# Patient Record
Sex: FEMALE | Race: OTHER | Employment: OTHER | URBAN - METROPOLITAN AREA
[De-identification: names, ages, dates, MRNs, and addresses within clinical notes are randomized per-mention and may not be internally consistent; named-entity substitution may affect disease eponyms.]

---

## 2018-04-26 NOTE — PATIENT DISCUSSION
Eyes appear healthy . Secondary to  Amblyopia and alternating hypertropia results nay be limited with Advanced Lenses. Recommend Custom.

## 2018-11-21 NOTE — PATIENT DISCUSSION
Eyes appear healthy . Secondary to  Amblyopia and alternating hypertropia results may be limited with [employer] Lenses. Recommend Custom.

## 2018-11-21 NOTE — PATIENT DISCUSSION
PLAN: CE/IOL OS THEN OD, GOAL YANA OU, NOT A CANDIDATE FOR AV SECONDARY TO H/O STRAB SX, P.O W/O.D IN SRQ, NO VAN NEEDED, WANTS CUSTOM OU. WANTS IMPRIMIS OU.

## 2020-11-30 NOTE — PATIENT DISCUSSION
The patient was informed that with [address] [facility] for distance, they will need glasses for all near and intermediate activities after surgery. The patient understands there is a possibility they may need an enhancement after surgery. The patient elects Custom Vision IOL OD, goal of emmetropia.

## 2020-12-02 NOTE — PATIENT DISCUSSION
The patient was informed that with [address] [facility] for distance, they will need glasses for all near and intermediate activities after surgery. The patient understands there is a possibility they may need an enhancement after surgery. The patient elects Custom Vision IOL OD, goal of emmetropia.

## 2020-12-04 NOTE — PATIENT DISCUSSION
The patient was informed that with [address] [facility] [address] for distance, they will need glasses for all near and intermediate activities after surgery. The patient understands there is a possibility they may need an enhancement after surgery. The patient elects Custom Vision IOL OD, goal of emmetropia.

## 2020-12-04 NOTE — PATIENT DISCUSSION
The patient was informed that with [address] [facility] for distance, they will need glasses for all near and intermediate activities after surgery. The patient understands there is a possibility they may need an enhancement after surgery. The patient elects Custom Vision IOL OD, goal of emmetropia.

## 2020-12-04 NOTE — PATIENT DISCUSSION
The patient was informed that with [address] [facility] for distance, they will need glasses for all near and intermediate activities after surgery. The patient understands there is a possibility they may need an enhancement after surgery. The patient elects Custom Vision IOL OD, goal of emmetropia.

## 2020-12-07 NOTE — PATIENT DISCUSSION
ok to proceed with IOL due to [address] for sx made today with [facility] and goal: Custom IOL (+/- toric) ethan. pt understands will likely need glasses for any tasks within arms length.

## 2020-12-14 NOTE — PATIENT DISCUSSION
Cataract surgery has been performed in the first eye and activities of daily living are still impaired. The patient would like to proceed with cataract surgery in the second eye as scheduled. The patient elects Custom IOL OS, ethan.

## 2020-12-14 NOTE — PATIENT DISCUSSION
10 day PO: Patient is doing well post-operatively. The importance of post-op drop compliance was emphasized. Drop schedule reviewed with patient. Patient to call if any visual changes or concerns.

## 2021-01-13 NOTE — PATIENT DISCUSSION
1/13/2021:  pt frustrated with exotropia OU - pt ed cataract Sx does not directly cause this and it was noted before Sx in our records.  I recommend she go see Dr Sharmin Dempsey in [facility] for his evaluation and assessment.

## 2022-02-14 ENCOUNTER — NEW PATIENT (OUTPATIENT)
Dept: URBAN - METROPOLITAN AREA CLINIC 44 | Facility: CLINIC | Age: 72
End: 2022-02-14

## 2022-02-14 DIAGNOSIS — D23.30: ICD-10-CM

## 2022-02-14 PROCEDURE — 92285 EXTERNAL OCULAR PHOTOGRAPHY: CPT

## 2022-02-14 PROCEDURE — 99203 OFFICE O/P NEW LOW 30 MIN: CPT

## 2022-02-14 PROCEDURE — 17000 DESTRUCT PREMALG LESION: CPT

## 2022-02-14 RX ORDER — NYSTATIN 100000 [USP'U]/G
CREAM ORAL; TOPICAL
Start: 2022-02-14 | End: 2022-02-28

## 2022-02-14 ASSESSMENT — VISUAL ACUITY
OS_SC: 20/20
OD_SC: 20/20

## 2022-02-14 NOTE — PATIENT DISCUSSION
Discussed nature of angular chelitis; recommend treatment with Rx nystatin cream and hydrocortisone 2.5% TID x 2 weeks.  If no improvement, recommend follow up with dermatologist.

## 2022-02-14 NOTE — PROCEDURE NOTE: CLINICAL
PROCEDURE NOTE: Destruction of All Benign or Premalignant Lesions; First Lesion Anesthesia: Local. Risks, benefits and alternatives were discussed. They understand they may need repeated treatments. The patient desires to proceed with removal today. The lesion was removed with a 27 g needle and contents discarded. The patient tolerated the procedure well and left the exam room in good condition. Daksha Sifuentes

## 2022-02-14 NOTE — PATIENT DISCUSSION
Also discussed that supporting corners of the mouth with HA filler will help prevent saliva from pooling there, which is potentiating the problem. If problem continues to recur, recommend patient see Kate Craven PA-C for filler at corners of mouth. Discussed starting with one syringe. Discussed cosmetic and not covered by insurance.

## 2022-03-10 ENCOUNTER — ESTABLISHED PATIENT (OUTPATIENT)
Dept: URBAN - METROPOLITAN AREA CLINIC 44 | Facility: CLINIC | Age: 72
End: 2022-03-10

## 2022-03-10 DIAGNOSIS — L98.8: ICD-10-CM

## 2022-03-10 PROCEDURE — 96999JUP JUVEDERM ULTRA PLUS 1 CC

## 2022-03-10 NOTE — PROCEDURE NOTE: CLINICAL
PROCEDURE NOTE: Juvederm Ultra Plus Full Face. Diagnosis: Rhytids, [place] Lines. Risks, benefits and alternatives were discussed. Patient desires to proceed with filler today. Involved area was thoroughly prepped using alcohol wipes. One 1ml syringe of product was used today to involved area. See chart diagram/plan notes for details. Patient tolerated the procedure well and left in good condition. Lot#: [id number]. Exp Date: 08/12/2022. Trevor Bloom

## 2024-03-22 ENCOUNTER — NEW PATIENT (OUTPATIENT)
Dept: URBAN - METROPOLITAN AREA CLINIC 43 | Facility: CLINIC | Age: 74
End: 2024-03-22

## 2024-03-22 DIAGNOSIS — H02.823: ICD-10-CM

## 2024-03-22 DIAGNOSIS — Z98.890: ICD-10-CM

## 2024-03-22 DIAGNOSIS — L98.8: ICD-10-CM

## 2024-03-22 DIAGNOSIS — D23.30: ICD-10-CM

## 2024-03-22 PROCEDURE — 92002 INTRM OPH EXAM NEW PATIENT: CPT

## 2024-03-22 ASSESSMENT — VISUAL ACUITY
OD_SC: 20/30-2
OS_SC: 20/20
